# Patient Record
Sex: FEMALE | Race: WHITE
[De-identification: names, ages, dates, MRNs, and addresses within clinical notes are randomized per-mention and may not be internally consistent; named-entity substitution may affect disease eponyms.]

---

## 2017-10-16 ENCOUNTER — HOSPITAL ENCOUNTER (OUTPATIENT)
Dept: HOSPITAL 22 - RAD | Age: 77
End: 2017-10-16
Attending: INTERNAL MEDICINE
Payer: MEDICARE

## 2017-10-16 DIAGNOSIS — R47.01: ICD-10-CM

## 2017-10-16 DIAGNOSIS — R51: Primary | ICD-10-CM

## 2017-10-17 NOTE — RADIOLOGY REPORT PS360
MRI-BRAIN W/O
 
HISTORY: Severe headache with achalasia
APHASIA, ACUTE NONINTRACTABLE HEADACHE
ORDERING PHYSICIAN: Otis Caal MD
PATIENT AGE:  77 years
 
 
COMPARISON: CT scan of 2/17/2010
 
TECHNIQUE: Standard multiplanar multiecho sequences are performed without
contrast.
 
FINDINGS: No midline shift, mass effect, intracranial hemorrhage, hydrocephalus,
or acute infarction is evident. The cerebellopontine angles, cerebellum, and
brainstem are unremarkable. Minimal nonspecific periventricular T2 white matter
hyperintensity noted likely related to minimal ischemic gliotic microvascular
changes. The pituitary and optic chiasm are unremarkable. The capital gyri are
unremarkable in the temporal horns are symmetric No mastoid effusion or sinus
air-fluid level.
 
 
IMPRESSION: 
 
No acute finding. Essentially negative MRI of the brain without contrast.

## 2018-01-03 ENCOUNTER — HOSPITAL ENCOUNTER (OUTPATIENT)
Age: 78
End: 2018-01-03
Payer: MEDICARE

## 2018-01-03 DIAGNOSIS — R19.07: ICD-10-CM

## 2018-01-03 DIAGNOSIS — S20.212A: Primary | ICD-10-CM

## 2018-01-03 LAB
BUN SERPL-MCNC: 17 MG/DL (ref 7–18)
CREAT BLD-SCNC: 1.03 MG/DL (ref 0.55–1.02)
ESTIMATED GLOMERULAR FILT RATE: 52 ML/MIN (ref 60–?)
GFR (AFRICAN AMERICAN): > 60 ML/MIN (ref 60–?)

## 2018-01-03 PROCEDURE — 74177 CT ABD & PELVIS W/CONTRAST: CPT

## 2018-01-03 PROCEDURE — 84520 ASSAY OF UREA NITROGEN: CPT

## 2018-01-03 PROCEDURE — 71260 CT THORAX DX C+: CPT

## 2018-01-03 PROCEDURE — 82565 ASSAY OF CREATININE: CPT

## 2018-01-03 PROCEDURE — 36415 COLL VENOUS BLD VENIPUNCTURE: CPT

## 2018-04-27 ENCOUNTER — HOSPITAL ENCOUNTER (OUTPATIENT)
Age: 78
End: 2018-04-27
Payer: MEDICARE

## 2018-04-27 DIAGNOSIS — Z78.0: Primary | ICD-10-CM

## 2018-04-27 DIAGNOSIS — Z13.820: ICD-10-CM

## 2018-04-27 PROCEDURE — 77080 DXA BONE DENSITY AXIAL: CPT

## 2018-05-14 ENCOUNTER — HOSPITAL ENCOUNTER (OUTPATIENT)
Age: 78
End: 2018-05-14
Payer: MEDICARE
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
## 2018-05-14 DIAGNOSIS — M25.531: Primary | ICD-10-CM

## 2018-05-14 DIAGNOSIS — R47.89: ICD-10-CM

## 2018-05-14 DIAGNOSIS — R27.0: ICD-10-CM

## 2018-05-14 LAB
ALBUMIN LEVEL: 3.8 GM/DL (ref 3.4–5)
ALBUMIN/GLOB SERPL: 1.2 {RATIO} (ref 1.1–1.8)
ALP ISO SERPL-ACNC: 113 U/L (ref 46–116)
ALT SERPLBLD-CCNC: 25 U/L (ref 12–78)
ANION GAP SERPL CALC-SCNC: 14.8 MEQ/L (ref 5–15)
AST SERPL QL: 16 U/L (ref 15–37)
BILIRUBIN,TOTAL: 0.6 MG/DL (ref 0.2–1)
BUN SERPL-MCNC: 20 MG/DL (ref 7–18)
CALCIUM SPEC-MCNC: 9.4 MG/DL (ref 8.5–10.1)
CHLORIDE SPEC-SCNC: 104 MMOL/L (ref 98–107)
CO2 SERPL-SCNC: 26 MMOL/L (ref 21–32)
CREAT BLD-SCNC: 0.92 MG/DL (ref 0.55–1.02)
ESTIMATED GLOMERULAR FILT RATE: 59 ML/MIN (ref 60–?)
FT4I SERPL CALC-MCNC: 2.7 UG/DL (ref 5.93–13.13)
GFR (AFRICAN AMERICAN): 72 ML/MIN (ref 60–?)
GLOBULIN SER CALC-MCNC: 3.3 GM/DL (ref 1.3–3.2)
GLUCOSE: 87 MG/DL (ref 74–106)
HCT VFR BLD CALC: 42.4 % (ref 37–47)
HGB BLD-MCNC: 13.9 G/DL (ref 12.2–16.2)
MCHC RBC-ENTMCNC: 32.8 G/DL (ref 31.8–35.4)
MCV RBC: 86.7 FL (ref 81–99)
MEAN CORPUSCULAR HEMOGLOBIN: 28.5 PG (ref 27–31.2)
PLATELET # BLD: 252 K/MM3 (ref 142–424)
POTASSIUM: 4.8 MMOL/L (ref 3.5–5.1)
PROT SERPL-MCNC: 7.1 GM/DL (ref 6.4–8.2)
RBC # BLD AUTO: 4.89 M/MM3 (ref 4.2–5.4)
SODIUM SPEC-SCNC: 140 MMOL/L (ref 136–145)
T3RU NFR SERPL: 36 % (ref 31–39)
T4 (THYROXINE): 7.5 UG/DL (ref 4.7–13.3)
TSH SERPL-ACNC: 1.48 UIU/ML (ref 0.36–3.74)
WBC # BLD AUTO: 5.8 K/MM3 (ref 4.8–10.8)

## 2018-05-14 PROCEDURE — 82607 VITAMIN B-12: CPT

## 2018-05-14 PROCEDURE — 84436 ASSAY OF TOTAL THYROXINE: CPT

## 2018-05-14 PROCEDURE — 85025 COMPLETE CBC W/AUTO DIFF WBC: CPT

## 2018-05-14 PROCEDURE — 36415 COLL VENOUS BLD VENIPUNCTURE: CPT

## 2018-05-14 PROCEDURE — 84443 ASSAY THYROID STIM HORMONE: CPT

## 2018-05-14 PROCEDURE — 73110 X-RAY EXAM OF WRIST: CPT

## 2018-05-14 PROCEDURE — 84479 ASSAY OF THYROID (T3 OR T4): CPT

## 2018-05-14 PROCEDURE — 80053 COMPREHEN METABOLIC PANEL: CPT

## 2018-05-14 PROCEDURE — 82131 AMINO ACIDS SINGLE QUANT: CPT

## 2018-05-15 LAB — VITAMIN B12: 450 PG/ML (ref 232–1245)

## 2018-05-18 ENCOUNTER — HOSPITAL ENCOUNTER (OUTPATIENT)
Age: 78
End: 2018-05-18
Payer: MEDICARE

## 2018-05-18 DIAGNOSIS — R47.89: ICD-10-CM

## 2018-05-18 DIAGNOSIS — R27.0: Primary | ICD-10-CM

## 2018-05-18 PROCEDURE — 70551 MRI BRAIN STEM W/O DYE: CPT

## 2018-08-15 ENCOUNTER — HOSPITAL ENCOUNTER (OUTPATIENT)
Dept: HOSPITAL 22 - ST | Age: 78
Discharge: HOME | End: 2018-08-15
Payer: MEDICARE

## 2018-08-15 DIAGNOSIS — R47.01: Primary | ICD-10-CM

## 2018-08-15 DIAGNOSIS — R47.89: ICD-10-CM

## 2018-08-15 PROCEDURE — 97532: CPT

## 2018-08-15 PROCEDURE — 92523 SPEECH SOUND LANG COMPREHEN: CPT

## 2018-08-15 PROCEDURE — 92507 TX SP LANG VOICE COMM INDIV: CPT

## 2018-09-26 ENCOUNTER — HOSPITAL ENCOUNTER (OUTPATIENT)
Age: 78
End: 2018-09-26
Payer: MEDICARE

## 2018-09-26 DIAGNOSIS — R05: ICD-10-CM

## 2018-09-26 DIAGNOSIS — R53.83: ICD-10-CM

## 2018-09-26 DIAGNOSIS — Z85.841: ICD-10-CM

## 2018-09-26 DIAGNOSIS — E78.2: ICD-10-CM

## 2018-09-26 DIAGNOSIS — R47.81: Primary | ICD-10-CM

## 2018-09-26 LAB
ALBUMIN LEVEL: 3.4 GM/DL (ref 3.4–5)
ALBUMIN/GLOB SERPL: 1.1 {RATIO} (ref 1.1–1.8)
ALP ISO SERPL-ACNC: 116 U/L (ref 46–116)
ALT SERPLBLD-CCNC: 21 U/L (ref 12–78)
ANION GAP SERPL CALC-SCNC: 11.8 MEQ/L (ref 5–15)
AST SERPL QL: 14 U/L (ref 15–37)
BILIRUBIN,TOTAL: 0.5 MG/DL (ref 0.2–1)
BUN SERPL-MCNC: 19 MG/DL (ref 7–18)
CALCIUM SPEC-MCNC: 8.9 MG/DL (ref 8.5–10.1)
CHLORIDE SPEC-SCNC: 104 MMOL/L (ref 98–107)
CHOLEST SPEC-SCNC: 191 MG/DL (ref 140–200)
CO2 SERPL-SCNC: 29 MMOL/L (ref 21–32)
CREAT BLD-SCNC: 0.93 MG/DL (ref 0.55–1.02)
ESTIMATED GLOMERULAR FILT RATE: 58 ML/MIN (ref 60–?)
GFR (AFRICAN AMERICAN): 71 ML/MIN (ref 60–?)
GLOBULIN SER CALC-MCNC: 3.1 GM/DL (ref 1.3–3.2)
GLUCOSE: 82 MG/DL (ref 74–106)
HCT VFR BLD CALC: 41.9 % (ref 37–47)
HDLC SERPL-MCNC: 64 MG/DL (ref 29–89)
HGB BLD-MCNC: 13.5 G/DL (ref 12.2–16.2)
MCHC RBC-ENTMCNC: 32.2 G/DL (ref 31.8–35.4)
MCV RBC: 87.8 FL (ref 81–99)
MEAN CORPUSCULAR HEMOGLOBIN: 28.3 PG (ref 27–31.2)
PLATELET # BLD: 232 K/MM3 (ref 142–424)
POTASSIUM: 4.8 MMOL/L (ref 3.5–5.1)
PROT SERPL-MCNC: 6.5 GM/DL (ref 6.4–8.2)
RBC # BLD AUTO: 4.77 M/MM3 (ref 4.2–5.4)
SODIUM SPEC-SCNC: 140 MMOL/L (ref 136–145)
T4 FREE SERPL-MCNC: 0.97 NG/DL (ref 0.76–1.46)
TRIGLYCERIDES: 103 MG/DL (ref 30–200)
TSH SERPL-ACNC: 2.47 UIU/ML (ref 0.36–3.74)
WBC # BLD AUTO: 5 K/MM3 (ref 4.8–10.8)

## 2018-09-26 PROCEDURE — 84439 ASSAY OF FREE THYROXINE: CPT

## 2018-09-26 PROCEDURE — 85025 COMPLETE CBC W/AUTO DIFF WBC: CPT

## 2018-09-26 PROCEDURE — 82652 VIT D 1 25-DIHYDROXY: CPT

## 2018-09-26 PROCEDURE — 80053 COMPREHEN METABOLIC PANEL: CPT

## 2018-09-26 PROCEDURE — 80061 LIPID PANEL: CPT

## 2018-09-26 PROCEDURE — 70551 MRI BRAIN STEM W/O DYE: CPT

## 2018-09-26 PROCEDURE — 82607 VITAMIN B-12: CPT

## 2018-09-26 PROCEDURE — 36415 COLL VENOUS BLD VENIPUNCTURE: CPT

## 2018-09-26 PROCEDURE — 84443 ASSAY THYROID STIM HORMONE: CPT

## 2018-09-27 LAB — VITAMIN B12: 441 PG/ML (ref 232–1245)

## 2019-01-28 ENCOUNTER — OFFICE VISIT (OUTPATIENT)
Dept: PULMONOLOGY | Facility: CLINIC | Age: 79
End: 2019-01-28

## 2019-01-28 VITALS
WEIGHT: 158.13 LBS | DIASTOLIC BLOOD PRESSURE: 70 MMHG | RESPIRATION RATE: 16 BRPM | HEIGHT: 65 IN | BODY MASS INDEX: 26.35 KG/M2 | SYSTOLIC BLOOD PRESSURE: 146 MMHG | HEART RATE: 61 BPM | OXYGEN SATURATION: 98 % | TEMPERATURE: 98.2 F

## 2019-01-28 DIAGNOSIS — K21.9 GASTROESOPHAGEAL REFLUX DISEASE, ESOPHAGITIS PRESENCE NOT SPECIFIED: ICD-10-CM

## 2019-01-28 DIAGNOSIS — J30.89 NON-SEASONAL ALLERGIC RHINITIS, UNSPECIFIED TRIGGER: ICD-10-CM

## 2019-01-28 DIAGNOSIS — R05.9 COUGH: Primary | ICD-10-CM

## 2019-01-28 DIAGNOSIS — R06.02 SOB (SHORTNESS OF BREATH): Primary | ICD-10-CM

## 2019-01-28 DIAGNOSIS — R05.3 CHRONIC COUGH: ICD-10-CM

## 2019-01-28 PROCEDURE — 94375 RESPIRATORY FLOW VOLUME LOOP: CPT | Performed by: INTERNAL MEDICINE

## 2019-01-28 PROCEDURE — 99204 OFFICE O/P NEW MOD 45 MIN: CPT | Performed by: INTERNAL MEDICINE

## 2019-01-28 PROCEDURE — 94729 DIFFUSING CAPACITY: CPT | Performed by: INTERNAL MEDICINE

## 2019-01-28 PROCEDURE — 94726 PLETHYSMOGRAPHY LUNG VOLUMES: CPT | Performed by: INTERNAL MEDICINE

## 2019-01-28 RX ORDER — CETIRIZINE HYDROCHLORIDE 10 MG/1
10 TABLET ORAL DAILY
COMMUNITY
End: 2019-01-28 | Stop reason: ALTCHOICE

## 2019-01-28 RX ORDER — OMEPRAZOLE 40 MG/1
40 CAPSULE, DELAYED RELEASE ORAL DAILY
Qty: 30 CAPSULE | Refills: 12 | Status: SHIPPED | OUTPATIENT
Start: 2019-01-28

## 2019-01-28 NOTE — PROGRESS NOTES
Viky Magana is a 78 y.o. female here for evaluation of   Chief Complaint   Patient presents with   • Cough   • Speech Problem       Problem list:  1. Chronic cough  2. Nonseasonal allergic rhinitis  3. GERD  4. Calcified granulomatous findings on chest x-ray  5.  2 para 2, no complications  6. No known drug allergies    History of Present Illness    78-year-old woman, lifetime nonsmoker, referred for evaluation of chronic cough. She reports a daily cough for the last 4 years. It is typically nonproductive. It can be triggered by talking. She has noted a change in her voice. It waxes and wanes but is never gone. She will cough when she lays down at night but when she goes to sleep it does not awaken her. She did take doxycycline in mid December for a productive cough. It caused some diarrhea and abdominal cramping. She has a significant history of GERD with reflux and burning. She was placed on Protonix and her son tells me that she reported improvement. However she did not continue the medication. She could not tell that it helped her cough. She has been seen by  for colonoscopy but has never had an upper endoscopy. She was referred to ear nose throat for her voice changes. She did undergo direct laryngoscopy. She was referred to speech therapy but only completed 2 sessions. Her sedimentation rate was 7, white count 5.8, hemoglobin 13.9, electrolytes normal. She had a CT scan of the chest in [place],  but I do not have those results. She denies exposure to noxious fumes. She has lived in the same house for 60 years. It has never flooded and she does not have visible mold. She cannot tell me anything that exacerbates the symptoms or improves the symptoms. Whether it be rather, scented candles, pollen she cannot tell. She does have a constant watery dripping from her nose. It does not change with seasons or locations. She does not travel much and cannot tell me if this if a couple of days away from  home makes her symptoms better. She worked at [employer] for 15 years before retiring. 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 She denies a history of asthma and denies wheezing.    Review of Systems   Constitutional: Negative for activity change, appetite change and fatigue.   HENT: Positive for rhinorrhea.    Eyes: Negative for visual disturbance.   Respiratory: Positive for cough.    Cardiovascular: Negative for chest pain, palpitations and leg swelling.   Gastrointestinal: Negative for abdominal distention, diarrhea and nausea.   Endocrine: Negative.    Genitourinary: Negative for dysuria and hematuria.   Musculoskeletal: Positive for arthralgias.   Skin: Negative for rash.   Allergic/Immunologic: Negative for environmental allergies.   Neurological: Negative for dizziness, syncope, light-headedness, numbness and headaches.   Hematological: Negative for adenopathy. Does not bruise/bleed easily.   Psychiatric/Behavioral: Negative.          Current Outpatient Medications:   •  Fluticasone Furoate-Vilanterol (BREO ELLIPTA) 100-25 MCG/INH inhaler, Inhale 1 puff Daily. 1 inhalation once a day, Disp: 1 each, Rfl: 5  •  omeprazole (priLOSEC) 40 MG capsule, Take 1 capsule by mouth Daily., Disp: 30 capsule, Rfl: 12    Past Medical History:   Diagnosis Date   • History of prior pregnancies      SVDx2     Past Surgical History:   Procedure Laterality Date   • COLONOSCOPY     • LARYNGOSCOPY  2018     Social History     Socioeconomic History   • Marital status:      Spouse name: Not on file   • Number of children: 2   • Years of education: Not on file   • Highest education level: Not on file   Occupational History   • Occupation: retired, [employer]   Tobacco Use   • Smoking status: Never Smoker   • Smokeless tobacco: Never Used   Substance and Sexual Activity   • Alcohol use: No     Frequency: Never   • Drug use: No   • Sexual activity: Defer     Family History   Problem Relation Age of  "Onset   • Cancer Mother    • Lymphoma Mother    • Stroke Brother    • Hypertension Brother    • Coronary artery disease Father    • Stomach cancer Brother    • No Known Problems Brother      Blood pressure 146/70, pulse 61, temperature 98.2 °F (36.8 °C), resp. rate 16, height 163.8 cm (64.5\"), weight 71.7 kg (158 lb 2 oz), SpO2 98 %.    Physical Exam   Constitutional: She is oriented to person, place, and time. She appears well-developed and well-nourished. No distress.   HENT:   Head: Normocephalic and atraumatic.   Nasal erythema, no ulcers, mild clear drainage, Nasal mucosa erythema, septum midline   Eyes: Pupils are equal, round, and reactive to light. No scleral icterus.   Neck: No JVD present. No tracheal deviation present. No thyromegaly present.   Cardiovascular: Normal rate, regular rhythm and normal heart sounds.   No murmur heard.  Pulmonary/Chest: Effort normal and breath sounds normal. No respiratory distress.   Mild end expiratory wheeze with forced expiration   Abdominal: Soft. Bowel sounds are normal. She exhibits no distension. There is no tenderness.   Musculoskeletal: She exhibits no edema.   Neurological: She is alert and oriented to person, place, and time.   Skin: Skin is warm and dry.   Psychiatric: She has a normal mood and affect.         PFTs:  FVC 1.87 L, 68%, FEV1 1.49 L, 72% ratio 79%, total lung capacity 3.76 L, 78%, diffusion capacity 13.2, 69%, possible restriction, mild diffusion impairment; although I suspect her vital capacity maneuver was not maximum in this resulted in a low FVC and diffusion capacity.    Radiology:  Chest x-ray report from [facility], October 30, 2018, no cardiomegaly, fine basilar scarring.    Chest x-ray today lungs are well-inflated, no cardiomegaly, mild calcified granulomatous changes in both tricia, no effusion or infiltrate    Lab:  November 2018, white count 5.8, hemoglobin 13.9, platelet count 272, sodium 142, potassium 4.4, chloride 102, bicarbonate " 24, BUN 20, creatinine 1, glucose 95, ESR 7    Viky was seen today for cough and speech problem.    Diagnoses and all orders for this visit:    Cough  -     XR Chest PA & Lateral    Chronic cough    Non-seasonal allergic rhinitis, unspecified trigger    Gastroesophageal reflux disease, esophagitis presence not specified    Other orders  -     omeprazole (priLOSEC) 40 MG capsule; Take 1 capsule by mouth Daily.  -     Fluticasone Furoate-Vilanterol (BREO ELLIPTA) 100-25 MCG/INH inhaler; Inhale 1 puff Daily. 1 inhalation once a day        Discussion:   Delightful 78-year-old woman here for evaluation of chronic cough. In general, a chronic cough in a nonsmoker with a clear chest x-ray is typically cough variant asthma, GERD, upper airway syndrome, industrial bronchitis. Rarely occult mitral valve disease, endobronchial lesions, medication, occult interstitial lung disease, post infectious cough can persist. Today on examination she does have very faint end expiratory wheezing. Her pulmonary function tests do not show significant obstruction but her vital capacity testing maneuver was suboptimal. She has daily reflux symptoms which can cause of adult onset asthma. In addition, she has significant clear sinus drainage which can also irritate the vocal cords and larynx and resultant in chronic cough. It does not appear to be seasonally driven. She cannot pin down any triggers for this cough or why she has some better days than others. I think she has cough so long that she does not even pay attention to it. Part of her lack of benefit from treatment is that she did not take an adequate trial as prescribed by her primary MD.    Obtained a CT scan of the chest, no contrast, high resolution to rule out bronchiectasis, interstitial lung disease    Obtain records from ear nose throat    Restart a proton pump inhibitor, omeprazole is covered by her insurance    Stopped Zyrtec, start Benadryl at bedtime    Start Breo 100-25mcg  1 puff daily, rinse mouth after use    Refer to gastroenterology for an EGD ( also time for colonoscopy- Anastasiya did her last one)    Follow-up in 4 weeks    Ree Ocampo MD

## 2019-01-29 DIAGNOSIS — R05.3 CHRONIC COUGH: Primary | ICD-10-CM

## 2019-01-29 DIAGNOSIS — K21.9 GASTROESOPHAGEAL REFLUX DISEASE, ESOPHAGITIS PRESENCE NOT SPECIFIED: ICD-10-CM

## 2019-02-04 ENCOUNTER — HOSPITAL ENCOUNTER (OUTPATIENT)
Dept: CT IMAGING | Facility: HOSPITAL | Age: 79
Discharge: HOME | End: 2019-02-04
Attending: INTERNAL MEDICINE | Admitting: INTERNAL MEDICINE

## 2019-02-04 DIAGNOSIS — R05.3 CHRONIC COUGH: ICD-10-CM

## 2019-02-04 PROCEDURE — 71250 CT THORAX DX C-: CPT

## 2019-02-13 ENCOUNTER — OFFICE VISIT (OUTPATIENT)
Dept: PULMONOLOGY | Facility: CLINIC | Age: 79
End: 2019-02-13

## 2019-02-13 VITALS
HEIGHT: 68 IN | OXYGEN SATURATION: 98 % | DIASTOLIC BLOOD PRESSURE: 82 MMHG | SYSTOLIC BLOOD PRESSURE: 130 MMHG | HEART RATE: 78 BPM | BODY MASS INDEX: 24.1 KG/M2 | TEMPERATURE: 98.6 F | WEIGHT: 159 LBS

## 2019-02-13 DIAGNOSIS — R47.1 DYSARTHRIA: ICD-10-CM

## 2019-02-13 DIAGNOSIS — J30.89 NON-SEASONAL ALLERGIC RHINITIS, UNSPECIFIED TRIGGER: ICD-10-CM

## 2019-02-13 DIAGNOSIS — K21.9 GASTROESOPHAGEAL REFLUX DISEASE, ESOPHAGITIS PRESENCE NOT SPECIFIED: ICD-10-CM

## 2019-02-13 DIAGNOSIS — R05.3 CHRONIC COUGH: Primary | ICD-10-CM

## 2019-02-13 PROCEDURE — 99214 OFFICE O/P EST MOD 30 MIN: CPT | Performed by: NURSE PRACTITIONER

## 2019-02-13 PROCEDURE — 94375 RESPIRATORY FLOW VOLUME LOOP: CPT | Performed by: NURSE PRACTITIONER

## 2019-02-13 RX ORDER — MONTELUKAST SODIUM 10 MG/1
10 TABLET ORAL NIGHTLY
Qty: 30 TABLET | Refills: 5 | Status: SHIPPED | OUTPATIENT
Start: 2019-02-13

## 2019-02-13 NOTE — PROGRESS NOTES
[facility] ([facility]    CHIEF COMPLAINT    Chronic cough    HISTORY OF PRESENT ILLNESS    Viky Magana is a 78 y.o.female here today for follow-up of her chronic cough.  She was last seen one month ago by .  At that time she was started on omeprazole daily for GERD.  She was also started on Breo 100 daily.  She states that her cough has improved since starting these medications.  She still has a dry cough on occasion.  Since her last visit she has also had an EGD and colonoscopy performed.  She has not had the results of these, she was told that her colonoscopy was normal and that she did not have to have a repeat colonoscopy.       she denies fever, chills, sputum production, hemoptysis, night sweats, weight loss, chest pain or palpitations.  She denies lower extremity edema.  Since starting the omeprazole she has not had any GERD symptoms.  She continues to have constant rhinorrhea, postnasal drip and nasal congestion.  She has been taking Zyrtec daily for her congestion.  She does not notice any change in her symptoms.      She has also having difficulty with her speech and has been having difficulty with this for some time.  She notices that she has difficulty getting the words out but she wants to say.  She will sometimes have to slow down and think about what she is going to say before comes out.  She was referred to a speech therapist but only completed 2 sessions and didn't like it was helping so she discontinued the treatment.  She's been evaluated by Dr. Akbar Guadarrama and states she had CT scan and MRI of her head.  The family states that they were not impressed with his care.    Patient Active Problem List   Diagnosis   • Chronic cough   • Non-seasonal allergic rhinitis   • GERD (gastroesophageal reflux disease)   • Dysarthria       No Known Allergies    Current Outpatient Medications:   •  Fluticasone Furoate-Vilanterol (BREO ELLIPTA) 100-25 MCG/INH inhaler, Inhale 1 puff Daily. 1  "inhalation once a day, Disp: 1 each, Rfl: 5  •  omeprazole (priLOSEC) 40 MG capsule, Take 1 capsule by mouth Daily., Disp: 30 capsule, Rfl: 12  •  montelukast (SINGULAIR) 10 MG tablet, Take 1 tablet by mouth Every Night., Disp: 30 tablet, Rfl: 5  MEDICATION LIST AND ALLERGIES REVIEWED.    Social History     Tobacco Use   • Smoking status: Never Smoker   • Smokeless tobacco: Never Used   Substance Use Topics   • Alcohol use: No     Frequency: Never   • Drug use: No       FAMILY AND SOCIAL HISTORY REVIEWED.    Review of Systems   Constitutional: Negative for activity change, appetite change, fatigue, fever and unexpected weight change.   HENT: Positive for congestion, postnasal drip and rhinorrhea. Negative for sinus pressure, sore throat and voice change.    Eyes: Negative for visual disturbance.   Respiratory: Negative for cough, chest tightness, shortness of breath and wheezing.    Cardiovascular: Negative for chest pain, palpitations and leg swelling.   Gastrointestinal: Negative for abdominal distention, abdominal pain, nausea and vomiting.   Endocrine: Negative for cold intolerance and heat intolerance.   Genitourinary: Negative for difficulty urinating and urgency.   Musculoskeletal: Negative for arthralgias, back pain and neck pain.   Skin: Negative for color change and pallor.   Allergic/Immunologic: Negative for environmental allergies and food allergies.   Neurological: Positive for speech difficulty. Negative for dizziness, syncope, weakness and light-headedness.   Hematological: Negative for adenopathy. Does not bruise/bleed easily.   Psychiatric/Behavioral: Negative for agitation and behavioral problems.   .    /82   Pulse 78   Temp 98.6 °F (37 °C)   Ht 172.7 cm (68\")   Wt 72.1 kg (159 lb)   SpO2 98% Comment: room air at rest  BMI 24.18 kg/m²     Immunization History   Administered Date(s) Administered   • Influenza, Unspecified 11/14/2018       Physical Exam   Constitutional: She is oriented " to person, place, and time. She appears well-developed and well-nourished.   HENT:   Head: Normocephalic and atraumatic.   Eyes: Pupils are equal, round, and reactive to light.   Neck: Normal range of motion. Neck supple. No thyromegaly present.   Cardiovascular: Normal rate, regular rhythm, normal heart sounds and intact distal pulses. Exam reveals no gallop and no friction rub.   No murmur heard.  Pulmonary/Chest: Effort normal and breath sounds normal. No respiratory distress. She has no wheezes. She has no rales. She exhibits no tenderness.   Abdominal: Soft. Bowel sounds are normal. There is no tenderness.   Musculoskeletal: Normal range of motion.   Lymphadenopathy:     She has no cervical adenopathy.   Neurological: She is alert and oriented to person, place, and time.   Skin: Skin is warm and dry. Capillary refill takes less than 2 seconds. She is not diaphoretic.   Psychiatric: She has a normal mood and affect. Her behavior is normal.   Nursing note and vitals reviewed.        RESULTS    PFTS in the office today, read by me.    Spirometry Interpretation 02/13/19:    1. No airway obstruction as the FEV1 ratio >= 0.7.  2. The maximum voluntary ventilation (MVV) is normal.    Ct Chest Hi Resolution    Result Date: 2/5/2019  Mild chronic interstitial changes seen predominantly at the lung bases with bronchiectatic changes centrally. There is no acute intrathoracic abnormality or parenchymal consolidation.  D:  02/05/2019 E:  02/05/2019  This report was finalized on 2/5/2019 3:13 PM by Dr. Jeannette Chapman MD.      PROBLEM LIST    Problem List Items Addressed This Visit        Respiratory    Chronic cough - Primary    Relevant Orders    Spirometry Without Bronchodilator (Completed)    Non-seasonal allergic rhinitis    Relevant Medications    montelukast (SINGULAIR) 10 MG tablet       Digestive    GERD (gastroesophageal reflux disease)       Nervous and Auditory    Dysarthria            DISCUSSION      Izzy was here for follow-up of her cough.  We reviewed her CT scan in detail today.  She she has bronchiectasis and mild chronic interstitial changes per CT scan.  We discussed bronchiectasis in detail today and the patient is understanding of this diagnosis.  She will continue to take Breo daily for her cough.  I'm going to add Singulair to her regimen for her chronic sinus drainage.  I also advised her that she could try a nasal spray to help dry up her sinuses.  She continues to have persistent nasal symptoms.  She states that her cough has improved since her last visit and she will need to continue this regimen from here on out.  I also advised her that she could  Mucinex and take 600 mg twice a day to help with secretion clearance.    She will continue omeprazole for her GERD.  She probably had some uncontrolled reflux as well that was contributing to her worsening cough.    I will obtain records from Dr. Akbar Guadarrama's office.  She has a history of dementia and her family and I'm concerned about her speech.  She states that she has had a CT scan of her head done in the past and an MRI.  She tells me that these were done at [facility].  I will obtain the reports from these records as well.  I did offer to refer her to start outpatient speech therapy and told her that she needed to try to complete the therapy if she wanted her speech to improve.  She is going to call me if she wants to restart this therapy.    She will follow-up in 3 months or sooner if her symptoms worsen.  I spent 25 minutes with the patient and family. I spent > 50% percent of this time counseling and discussing diagnosis, prognosis, diagnostic testing, evaluation, current status, treatment options and management.    TWILA Blanca  02/13/20192:54 PM  Electronically signed     Please note that portions of this note were completed with a voice recognition program. Efforts were made to edit the dictations, but occasionally  words are mistranscribed.      CC: Eamon Gann MD

## 2019-05-21 ENCOUNTER — TELEPHONE (OUTPATIENT)
Dept: PULMONOLOGY | Facility: CLINIC | Age: 79
End: 2019-05-21

## 2019-05-21 NOTE — TELEPHONE ENCOUNTER
ATTEMPTED CALL TO RESCHEDULE APPT THAT PT MISSED TODAY WITH VERNELL GALARZA. NO ANSWER AND NO VOICEMAIL PHONE JUST RINGS.

## 2019-06-19 ENCOUNTER — HOSPITAL ENCOUNTER (OUTPATIENT)
Age: 79
End: 2019-06-19
Payer: MEDICARE

## 2019-06-19 DIAGNOSIS — R41.89: Primary | ICD-10-CM

## 2019-06-19 LAB
ALBUMIN LEVEL: 3.4 GM/DL (ref 3.4–5)
ALBUMIN/GLOB SERPL: 1 {RATIO} (ref 1.1–1.8)
ALP ISO SERPL-ACNC: 102 U/L (ref 46–116)
ALT SERPLBLD-CCNC: 38 U/L (ref 12–78)
ANION GAP SERPL CALC-SCNC: 11.6 MEQ/L (ref 5–15)
AST SERPL QL: 27 U/L (ref 15–37)
BILIRUBIN,TOTAL: 0.4 MG/DL (ref 0.2–1)
BUN SERPL-MCNC: 16 MG/DL (ref 7–18)
CALCIUM SPEC-MCNC: 8.3 MG/DL (ref 8.5–10.1)
CHLORIDE SPEC-SCNC: 106 MMOL/L (ref 98–107)
CO2 SERPL-SCNC: 27 MMOL/L (ref 21–32)
CREAT BLD-SCNC: 1.05 MG/DL (ref 0.55–1.02)
ESTIMATED GLOMERULAR FILT RATE: 51 ML/MIN (ref 60–?)
GFR (AFRICAN AMERICAN): 61 ML/MIN (ref 60–?)
GLOBULIN SER CALC-MCNC: 3.4 GM/DL (ref 1.3–3.2)
GLUCOSE: 93 MG/DL (ref 74–106)
HCT VFR BLD CALC: 41.1 % (ref 37–47)
HGB BLD-MCNC: 13 G/DL (ref 12.2–16.2)
MCHC RBC-ENTMCNC: 31.7 G/DL (ref 31.8–35.4)
MCV RBC: 84.3 FL (ref 81–99)
MEAN CORPUSCULAR HEMOGLOBIN: 26.7 PG (ref 27–31.2)
PLATELET # BLD: 254 K/MM3 (ref 142–424)
POTASSIUM: 4.6 MMOL/L (ref 3.5–5.1)
PROT SERPL-MCNC: 6.8 GM/DL (ref 6.4–8.2)
RBC # BLD AUTO: 4.87 M/MM3 (ref 4.2–5.4)
SODIUM SPEC-SCNC: 140 MMOL/L (ref 136–145)
WBC # BLD AUTO: 5.2 K/MM3 (ref 4.8–10.8)

## 2019-06-19 PROCEDURE — 85651 RBC SED RATE NONAUTOMATED: CPT

## 2019-06-19 PROCEDURE — 36415 COLL VENOUS BLD VENIPUNCTURE: CPT

## 2019-06-19 PROCEDURE — 85025 COMPLETE CBC W/AUTO DIFF WBC: CPT

## 2019-06-19 PROCEDURE — 82607 VITAMIN B-12: CPT

## 2019-06-19 PROCEDURE — 70552 MRI BRAIN STEM W/DYE: CPT

## 2019-06-19 PROCEDURE — 82746 ASSAY OF FOLIC ACID SERUM: CPT

## 2019-06-19 PROCEDURE — A9576 INJ PROHANCE MULTIPACK: HCPCS

## 2019-06-19 PROCEDURE — 80053 COMPREHEN METABOLIC PANEL: CPT

## 2019-06-20 LAB
FOLATE: 12.7 NG/ML (ref 3–?)
VITAMIN B12: 441 PG/ML (ref 232–1245)

## 2019-09-03 ENCOUNTER — HOSPITAL ENCOUNTER (OUTPATIENT)
Age: 79
End: 2019-09-03
Payer: MEDICARE

## 2019-09-03 DIAGNOSIS — G31.01: Primary | ICD-10-CM

## 2019-09-03 PROCEDURE — 95819 EEG AWAKE AND ASLEEP: CPT

## 2020-01-14 ENCOUNTER — HOSPITAL ENCOUNTER (OUTPATIENT)
Age: 80
End: 2020-01-14
Payer: MEDICARE

## 2020-01-14 DIAGNOSIS — S52.612A: ICD-10-CM

## 2020-01-14 DIAGNOSIS — Z01.818: Primary | ICD-10-CM

## 2020-01-14 DIAGNOSIS — S52.532A: ICD-10-CM

## 2020-01-14 LAB
ALBUMIN LEVEL: 3.7 GM/DL (ref 3.4–5)
ALBUMIN/GLOB SERPL: 1.2 {RATIO} (ref 1.1–1.8)
ALP ISO SERPL-ACNC: 115 U/L (ref 46–116)
ALT SERPLBLD-CCNC: 24 U/L (ref 12–78)
ANION GAP SERPL CALC-SCNC: 13.6 MEQ/L (ref 5–15)
AST SERPL QL: 15 U/L (ref 15–37)
BILIRUBIN,TOTAL: 0.6 MG/DL (ref 0.2–1)
BUN SERPL-MCNC: 18 MG/DL (ref 7–18)
CALCIUM SPEC-MCNC: 9.2 MG/DL (ref 8.5–10.1)
CHLORIDE SPEC-SCNC: 101 MMOL/L (ref 98–107)
CO2 SERPL-SCNC: 27 MMOL/L (ref 21–32)
CREAT BLD-SCNC: 1.02 MG/DL (ref 0.55–1.02)
ESTIMATED GLOMERULAR FILT RATE: 52 ML/MIN (ref 60–?)
GFR (AFRICAN AMERICAN): 63 ML/MIN (ref 60–?)
GLOBULIN SER CALC-MCNC: 3.1 GM/DL (ref 1.3–3.2)
GLUCOSE: 91 MG/DL (ref 74–106)
HCT VFR BLD CALC: 43.3 % (ref 37–47)
HGB BLD-MCNC: 14.1 G/DL (ref 12.2–16.2)
MCHC RBC-ENTMCNC: 32.5 G/DL (ref 31.8–35.4)
MCV RBC: 88.5 FL (ref 81–99)
MEAN CORPUSCULAR HEMOGLOBIN: 28.8 PG (ref 27–31.2)
PLATELET # BLD: 326 K/MM3 (ref 142–424)
POTASSIUM: 4.6 MMOL/L (ref 3.5–5.1)
PROT SERPL-MCNC: 6.8 GM/DL (ref 6.4–8.2)
RBC # BLD AUTO: 4.9 M/MM3 (ref 4.2–5.4)
SODIUM SPEC-SCNC: 137 MMOL/L (ref 136–145)
WBC # BLD AUTO: 9.6 K/MM3 (ref 4.8–10.8)

## 2020-01-14 PROCEDURE — 36415 COLL VENOUS BLD VENIPUNCTURE: CPT

## 2020-01-14 PROCEDURE — 93005 ELECTROCARDIOGRAM TRACING: CPT

## 2020-01-14 PROCEDURE — 80053 COMPREHEN METABOLIC PANEL: CPT

## 2020-01-14 PROCEDURE — 85025 COMPLETE CBC W/AUTO DIFF WBC: CPT

## 2020-02-04 ENCOUNTER — HOSPITAL ENCOUNTER (OUTPATIENT)
Age: 80
End: 2020-02-04
Payer: MEDICARE

## 2020-02-04 DIAGNOSIS — Z48.89: Primary | ICD-10-CM

## 2020-02-04 DIAGNOSIS — S52.612A: ICD-10-CM

## 2020-02-04 PROCEDURE — 73110 X-RAY EXAM OF WRIST: CPT

## 2020-02-25 ENCOUNTER — HOSPITAL ENCOUNTER (OUTPATIENT)
Age: 80
End: 2020-02-25
Payer: MEDICARE

## 2020-02-25 ENCOUNTER — HOSPITAL ENCOUNTER (OUTPATIENT)
Dept: HOSPITAL 22 - OT | Age: 80
Discharge: HOME | End: 2020-02-25
Payer: MEDICARE

## 2020-02-25 DIAGNOSIS — Z48.89: Primary | ICD-10-CM

## 2020-02-25 DIAGNOSIS — S52.612A: ICD-10-CM

## 2020-02-25 DIAGNOSIS — S52.502D: ICD-10-CM

## 2020-02-25 DIAGNOSIS — S52.532A: Primary | ICD-10-CM

## 2020-02-25 PROCEDURE — 97763 ORTHC/PROSTC MGMT SBSQ ENC: CPT

## 2020-02-25 PROCEDURE — 73110 X-RAY EXAM OF WRIST: CPT

## 2020-03-13 ENCOUNTER — HOSPITAL ENCOUNTER (OUTPATIENT)
Dept: HOSPITAL 22 - PT.CARL | Age: 80
LOS: 19 days | Discharge: HOME | End: 2020-04-01
Payer: MEDICARE

## 2020-03-13 ENCOUNTER — HOSPITAL ENCOUNTER (OUTPATIENT)
Dept: HOSPITAL 22 - ST | Age: 80
Discharge: HOME | End: 2020-03-13
Payer: MEDICARE

## 2020-03-13 DIAGNOSIS — Z48.89: ICD-10-CM

## 2020-03-13 DIAGNOSIS — S52.532D: Primary | ICD-10-CM

## 2020-03-13 DIAGNOSIS — R47.01: Primary | ICD-10-CM

## 2020-03-13 DIAGNOSIS — S52.612D: ICD-10-CM

## 2020-03-13 PROCEDURE — 97018 PARAFFIN BATH THERAPY: CPT

## 2020-03-13 PROCEDURE — 97163 PT EVAL HIGH COMPLEX 45 MIN: CPT

## 2020-03-13 PROCEDURE — 97110 THERAPEUTIC EXERCISES: CPT

## 2020-03-13 PROCEDURE — 97140 MANUAL THERAPY 1/> REGIONS: CPT

## 2020-03-13 PROCEDURE — 92523 SPEECH SOUND LANG COMPREHEN: CPT

## 2020-03-13 PROCEDURE — 92507 TX SP LANG VOICE COMM INDIV: CPT

## 2020-03-15 ENCOUNTER — HOSPITAL ENCOUNTER (INPATIENT)
Dept: HOSPITAL 22 - 2ND | Age: 80
LOS: 3 days | Discharge: HOME | DRG: 391 | End: 2020-03-18
Attending: INTERNAL MEDICINE | Admitting: INTERNAL MEDICINE

## 2020-03-15 LAB
ALBUMIN LEVEL: 4.4 G/DL (ref 3.5–5)
ALBUMIN/GLOB SERPL: 1.4 {RATIO} (ref 1.1–1.8)
ALP ISO SERPL-ACNC: 100 U/L (ref 38–126)
ALT SERPLBLD-CCNC: 41 U/L (ref 12–78)
AMORPH SED URNS QL MICRO: (no result) /LPF
AMYLASE SERPL-CCNC: 110 U/L (ref 30–110)
ANION GAP SERPL CALC-SCNC: 16.1 MEQ/L (ref 5–15)
AST SERPL QL: 34 U/L (ref 14–36)
BILIRUBIN,TOTAL: 0.6 MG/DL (ref 0.2–1.3)
BUN SERPL-MCNC: 17 MG/DL (ref 7–17)
CALCIUM SPEC-MCNC: 10 MG/DL (ref 8.4–10.2)
CELLS COUNTED: 100
CHLORIDE SPEC-SCNC: 97 MMOL/L (ref 98–107)
CO2 SERPL-SCNC: 26 MMOL/L (ref 22–30)
COLOR UR: YELLOW
GLOBULIN SER CALC-MCNC: 3.1 G/DL (ref 1.3–3.2)
GLUCOSE: 173 MG/DL (ref 74–100)
HCT VFR BLD CALC: 46 % (ref 37–47)
HGB BLD-MCNC: 15.4 G/DL (ref 12.2–16.2)
LEUKOCYTE ESTERASE UR QL STRIP: (no result)
MCHC RBC-ENTMCNC: 33.5 G/DL (ref 31.8–35.4)
MCV RBC: 88.7 FL (ref 81–99)
MICRO URNS: (no result)
PH UR: 5.5 [PH] (ref 5–8.5)
PLATELET # BLD: 257 K/MM3 (ref 142–424)
PROT SERPL-MCNC: 7.5 G/DL (ref 6.3–8.2)
PROT UR STRIP-MCNC: (no result) MG/DL
RBC # BLD AUTO: 5.18 M/MM3 (ref 4.2–5.4)
RBC #/AREA URNS HPF: (no result) #/HPF (ref 0–3)
SODIUM SPEC-SCNC: 135 MMOL/L (ref 136–145)
SP GR UR: >= 1.03 (ref 1–1.03)
TRANS CELLS URNS QL MICRO: (no result) #/LPF (ref 0–3)
UROBILINOGEN UR QL: 0.2 EU/DL
WBC # BLD AUTO: 24 K/MM3 (ref 4.8–10.8)

## 2020-03-15 PROCEDURE — 71046 X-RAY EXAM CHEST 2 VIEWS: CPT

## 2020-03-15 PROCEDURE — 36415 COLL VENOUS BLD VENIPUNCTURE: CPT

## 2020-03-15 PROCEDURE — 84484 ASSAY OF TROPONIN QUANT: CPT

## 2020-03-15 PROCEDURE — 87070 CULTURE OTHR SPECIMN AEROBIC: CPT

## 2020-03-15 PROCEDURE — 80048 BASIC METABOLIC PNL TOTAL CA: CPT

## 2020-03-15 PROCEDURE — 99285 EMERGENCY DEPT VISIT HI MDM: CPT

## 2020-03-15 PROCEDURE — 87040 BLOOD CULTURE FOR BACTERIA: CPT

## 2020-03-15 PROCEDURE — 93005 ELECTROCARDIOGRAM TRACING: CPT

## 2020-03-15 PROCEDURE — 71045 X-RAY EXAM CHEST 1 VIEW: CPT

## 2020-03-15 PROCEDURE — 74177 CT ABD & PELVIS W/CONTRAST: CPT

## 2020-03-15 PROCEDURE — 82150 ASSAY OF AMYLASE: CPT

## 2020-03-15 PROCEDURE — 96375 TX/PRO/DX INJ NEW DRUG ADDON: CPT

## 2020-03-15 PROCEDURE — 85007 BL SMEAR W/DIFF WBC COUNT: CPT

## 2020-03-15 PROCEDURE — 81001 URINALYSIS AUTO W/SCOPE: CPT

## 2020-03-15 PROCEDURE — 71020: CPT

## 2020-03-15 PROCEDURE — 96365 THER/PROPH/DIAG IV INF INIT: CPT

## 2020-03-15 PROCEDURE — 85025 COMPLETE CBC W/AUTO DIFF WBC: CPT

## 2020-03-15 PROCEDURE — 87205 SMEAR GRAM STAIN: CPT

## 2020-03-15 PROCEDURE — 71010: CPT

## 2020-03-15 PROCEDURE — 83605 ASSAY OF LACTIC ACID: CPT

## 2020-03-15 PROCEDURE — 80053 COMPREHEN METABOLIC PANEL: CPT

## 2020-03-15 PROCEDURE — 96367 TX/PROPH/DG ADDL SEQ IV INF: CPT

## 2020-03-15 PROCEDURE — 87086 URINE CULTURE/COLONY COUNT: CPT

## 2020-03-15 PROCEDURE — 87506 IADNA-DNA/RNA PROBE TQ 6-11: CPT

## 2020-03-15 PROCEDURE — 83690 ASSAY OF LIPASE: CPT

## 2020-03-17 LAB
ALBUMIN LEVEL: 3 G/DL (ref 3.5–5)
ALBUMIN/GLOB SERPL: 1.1 {RATIO} (ref 1.1–1.8)
ALP ISO SERPL-ACNC: 80 U/L (ref 38–126)
ALT SERPLBLD-CCNC: 14 U/L (ref 12–78)
ANION GAP SERPL CALC-SCNC: 9.5 MEQ/L (ref 5–15)
AST SERPL QL: 28 U/L (ref 14–36)
BILIRUBIN,TOTAL: 0.6 MG/DL (ref 0.2–1.3)
BUN SERPL-MCNC: 8 MG/DL (ref 7–17)
CALCIUM SPEC-MCNC: 8.4 MG/DL (ref 8.4–10.2)
CELLS COUNTED: 100
CHLORIDE SPEC-SCNC: 101 MMOL/L (ref 98–107)
CO2 SERPL-SCNC: 27 MMOL/L (ref 22–30)
GLOBULIN SER CALC-MCNC: 2.8 G/DL (ref 1.3–3.2)
GLUCOSE: 92 MG/DL (ref 74–100)
HCT VFR BLD CALC: 37 % (ref 37–47)
HGB BLD-MCNC: 12.3 G/DL (ref 12.2–16.2)
MCHC RBC-ENTMCNC: 33.4 G/DL (ref 31.8–35.4)
MCV RBC: 89.1 FL (ref 81–99)
PLATELET # BLD: 213 K/MM3 (ref 142–424)
PROT SERPL-MCNC: 5.8 G/DL (ref 6.3–8.2)
RBC # BLD AUTO: 4.15 M/MM3 (ref 4.2–5.4)
SODIUM SPEC-SCNC: 134 MMOL/L (ref 136–145)
WBC # BLD AUTO: 14 K/MM3 (ref 4.8–10.8)

## 2020-03-18 LAB
ANION GAP SERPL CALC-SCNC: 10.5 MEQ/L (ref 5–15)
BUN SERPL-MCNC: 8 MG/DL (ref 7–17)
CALCIUM SPEC-MCNC: 8.3 MG/DL (ref 8.4–10.2)
CHLORIDE SPEC-SCNC: 102 MMOL/L (ref 98–107)
CO2 SERPL-SCNC: 25 MMOL/L (ref 22–30)
GLUCOSE: 94 MG/DL (ref 74–100)
HCT VFR BLD CALC: 36.8 % (ref 37–47)
HGB BLD-MCNC: 12.3 G/DL (ref 12.2–16.2)
MCHC RBC-ENTMCNC: 33.6 G/DL (ref 31.8–35.4)
MCV RBC: 87.6 FL (ref 81–99)
PLATELET # BLD: 204 K/MM3 (ref 142–424)
RBC # BLD AUTO: 4.19 M/MM3 (ref 4.2–5.4)
SODIUM SPEC-SCNC: 134 MMOL/L (ref 136–145)
WBC # BLD AUTO: 10.4 K/MM3 (ref 4.8–10.8)

## 2020-03-31 ENCOUNTER — HOSPITAL ENCOUNTER (OUTPATIENT)
Age: 80
End: 2020-03-31
Payer: MEDICARE

## 2020-03-31 DIAGNOSIS — S52.612D: ICD-10-CM

## 2020-03-31 DIAGNOSIS — Z09: Primary | ICD-10-CM

## 2020-03-31 DIAGNOSIS — S52.532D: ICD-10-CM

## 2020-03-31 PROCEDURE — 73110 X-RAY EXAM OF WRIST: CPT

## 2020-04-10 ENCOUNTER — HOSPITAL ENCOUNTER (EMERGENCY)
Age: 80
Discharge: ANOTHER HOSPITAL | End: 2020-04-10
Payer: MEDICARE

## 2020-04-10 VITALS
SYSTOLIC BLOOD PRESSURE: 159 MMHG | TEMPERATURE: 97.8 F | RESPIRATION RATE: 20 BRPM | HEART RATE: 103 BPM | BODY MASS INDEX: 24.3 KG/M2 | OXYGEN SATURATION: 91 % | DIASTOLIC BLOOD PRESSURE: 106 MMHG

## 2020-04-10 VITALS
DIASTOLIC BLOOD PRESSURE: 102 MMHG | OXYGEN SATURATION: 100 % | SYSTOLIC BLOOD PRESSURE: 152 MMHG | RESPIRATION RATE: 20 BRPM | HEART RATE: 86 BPM

## 2020-04-10 VITALS
RESPIRATION RATE: 28 BRPM | OXYGEN SATURATION: 97 % | SYSTOLIC BLOOD PRESSURE: 156 MMHG | HEART RATE: 72 BPM | DIASTOLIC BLOOD PRESSURE: 59 MMHG

## 2020-04-10 VITALS — OXYGEN SATURATION: 100 %

## 2020-04-10 VITALS
OXYGEN SATURATION: 97 % | SYSTOLIC BLOOD PRESSURE: 174 MMHG | RESPIRATION RATE: 24 BRPM | DIASTOLIC BLOOD PRESSURE: 89 MMHG | TEMPERATURE: 98.78 F | HEART RATE: 98 BPM

## 2020-04-10 DIAGNOSIS — G30.9: ICD-10-CM

## 2020-04-10 DIAGNOSIS — Z79.899: ICD-10-CM

## 2020-04-10 DIAGNOSIS — I26.09: Primary | ICD-10-CM

## 2020-04-10 LAB
ALBUMIN LEVEL: 3.9 G/DL (ref 3.5–5)
ALBUMIN/GLOB SERPL: 1.2 {RATIO} (ref 1.1–1.8)
ALP ISO SERPL-ACNC: 96 U/L (ref 38–126)
ALT SERPLBLD-CCNC: 30 U/L (ref 12–78)
ANION GAP SERPL CALC-SCNC: 11.2 MEQ/L (ref 5–15)
AST SERPL QL: 32 U/L (ref 14–36)
BILIRUBIN,TOTAL: 0.4 MG/DL (ref 0.2–1.3)
BUN SERPL-MCNC: 20 MG/DL (ref 7–17)
CALCIUM SPEC-MCNC: 9.3 MG/DL (ref 8.4–10.2)
CHLORIDE SPEC-SCNC: 100 MMOL/L (ref 98–107)
CO2 BLDCOA-SCNC: 27.4 MMHG (ref 23–27)
CO2 SERPL-SCNC: 27 MMOL/L (ref 22–30)
COLOR UR: YELLOW
CREAT BLD-SCNC: 1.1 MG/DL (ref 0.52–1.04)
CREATININE CLEARANCE ESTIMATED: 48 ML/MIN (ref 50–200)
ESTIMATED GLOMERULAR FILT RATE: 48 ML/MIN (ref 60–?)
GFR (AFRICAN AMERICAN): 58 ML/MIN (ref 60–?)
GLOBULIN SER CALC-MCNC: 3.2 G/DL (ref 1.3–3.2)
GLUCOSE: 108 MG/DL (ref 74–100)
HCO3 BLDA-SCNC: 26.4 MMHG (ref 22–26)
HCT VFR BLD CALC: 41.9 % (ref 37–47)
HGB BLD-MCNC: 14 G/DL (ref 12.2–16.2)
LACTATE SERPL-MCNC: 2.3 MMOL/L (ref 0.7–2.1)
MCHC RBC-ENTMCNC: 33.3 G/DL (ref 31.8–35.4)
MCV RBC: 88.7 FL (ref 81–99)
MEAN CORPUSCULAR HEMOGLOBIN: 29.6 PG (ref 27–31.2)
MICRO URNS: (no result)
PCO2 BLDA: 35.3 MMHG (ref 35–45)
PH BLDA: 7.49 MMOL/L (ref 7.35–7.45)
PH UR: 6 [PH] (ref 5–8.5)
PLATELET # BLD: 224 K/MM3 (ref 142–424)
PO2 BLDA: 98.1 MMHG (ref 80–100)
POTASSIUM: 4.2 MMOL/L (ref 3.5–5.1)
PROT SERPL-MCNC: 7.1 G/DL (ref 6.3–8.2)
RBC # BLD AUTO: 4.72 M/MM3 (ref 4.2–5.4)
RBC #/AREA URNS HPF: (no result) #/HPF (ref 0–3)
REFLEX LACTIC: (no result)
SODIUM SPEC-SCNC: 134 MMOL/L (ref 136–145)
SOURCE: (no result)
SP GR UR: 1.02 (ref 1–1.03)
TROPONIN I: 0.01 NG/ML (ref 0–0.03)
TROPONIN I: 0.01 NG/ML (ref 0–0.03)
UROBILINOGEN UR QL: 0.2 EU/DL
WBC # BLD AUTO: 8.5 K/MM3 (ref 4.8–10.8)
WBC # UR: (no result) #/HPF (ref 0–3)

## 2020-04-10 PROCEDURE — 87430 STREP A AG IA: CPT

## 2020-04-10 PROCEDURE — 93005 ELECTROCARDIOGRAM TRACING: CPT

## 2020-04-10 PROCEDURE — 82803 BLOOD GASES ANY COMBINATION: CPT

## 2020-04-10 PROCEDURE — 96375 TX/PRO/DX INJ NEW DRUG ADDON: CPT

## 2020-04-10 PROCEDURE — 99285 EMERGENCY DEPT VISIT HI MDM: CPT

## 2020-04-10 PROCEDURE — 84484 ASSAY OF TROPONIN QUANT: CPT

## 2020-04-10 PROCEDURE — 81001 URINALYSIS AUTO W/SCOPE: CPT

## 2020-04-10 PROCEDURE — 71275 CT ANGIOGRAPHY CHEST: CPT

## 2020-04-10 PROCEDURE — 36415 COLL VENOUS BLD VENIPUNCTURE: CPT

## 2020-04-10 PROCEDURE — 96367 TX/PROPH/DG ADDL SEQ IV INF: CPT

## 2020-04-10 PROCEDURE — 87276 INFLUENZA A AG IF: CPT

## 2020-04-10 PROCEDURE — 87275 INFLUENZA B AG IF: CPT

## 2020-04-10 PROCEDURE — 87040 BLOOD CULTURE FOR BACTERIA: CPT

## 2020-04-10 PROCEDURE — 96365 THER/PROPH/DIAG IV INF INIT: CPT

## 2020-04-10 PROCEDURE — 71045 X-RAY EXAM CHEST 1 VIEW: CPT

## 2020-04-10 PROCEDURE — 85025 COMPLETE CBC W/AUTO DIFF WBC: CPT

## 2020-04-10 PROCEDURE — 83605 ASSAY OF LACTIC ACID: CPT

## 2020-04-10 PROCEDURE — 80053 COMPREHEN METABOLIC PANEL: CPT

## 2020-06-30 ENCOUNTER — HOSPITAL ENCOUNTER (OUTPATIENT)
Age: 80
End: 2020-06-30
Payer: MEDICARE

## 2020-06-30 DIAGNOSIS — R05: ICD-10-CM

## 2020-06-30 DIAGNOSIS — R41.3: ICD-10-CM

## 2020-06-30 DIAGNOSIS — R47.1: ICD-10-CM

## 2020-06-30 DIAGNOSIS — R13.10: ICD-10-CM

## 2020-06-30 DIAGNOSIS — I63.9: Primary | ICD-10-CM

## 2020-06-30 DIAGNOSIS — R47.01: ICD-10-CM

## 2020-06-30 LAB
ALBUMIN LEVEL: 3.9 G/DL (ref 3.5–5)
ALBUMIN/GLOB SERPL: 1.3 {RATIO} (ref 1.1–1.8)
ALP ISO SERPL-ACNC: 99 U/L (ref 38–126)
ALT SERPLBLD-CCNC: 19 U/L (ref 12–78)
ANION GAP SERPL CALC-SCNC: 12.2 MEQ/L (ref 5–15)
AST SERPL QL: 25 U/L (ref 14–36)
BILIRUBIN,TOTAL: 0.5 MG/DL (ref 0.2–1.3)
BUN SERPL-MCNC: 23 MG/DL (ref 7–17)
CALCIUM SPEC-MCNC: 9.2 MG/DL (ref 8.4–10.2)
CHLORIDE SPEC-SCNC: 104 MMOL/L (ref 98–107)
CHOLEST SPEC-SCNC: 189 MG/DL (ref 140–200)
CO2 SERPL-SCNC: 28 MMOL/L (ref 22–30)
CREAT BLD-SCNC: 1.1 MG/DL (ref 0.52–1.04)
ESTIMATED GLOMERULAR FILT RATE: 48 ML/MIN (ref 60–?)
GFR (AFRICAN AMERICAN): 58 ML/MIN (ref 60–?)
GLOBULIN SER CALC-MCNC: 3 G/DL (ref 1.3–3.2)
GLUCOSE: 126 MG/DL (ref 74–100)
HCT VFR BLD CALC: 39.2 % (ref 37–47)
HDLC SERPL-MCNC: 72 MG/DL (ref 40–60)
HGB BLD-MCNC: 13.4 G/DL (ref 12.2–16.2)
MCHC RBC-ENTMCNC: 34.1 G/DL (ref 31.8–35.4)
MCV RBC: 89 FL (ref 81–99)
MEAN CORPUSCULAR HEMOGLOBIN: 30.4 PG (ref 27–31.2)
PLATELET # BLD: 271 K/MM3 (ref 142–424)
POTASSIUM: 4.2 MMOL/L (ref 3.5–5.1)
PROT SERPL-MCNC: 6.9 G/DL (ref 6.3–8.2)
RBC # BLD AUTO: 4.41 M/MM3 (ref 4.2–5.4)
SODIUM SPEC-SCNC: 140 MMOL/L (ref 136–145)
TRIGLYCERIDES: 214 MG/DL (ref 30–150)
TSH SERPL-ACNC: 1.78 UIU/ML (ref 0.47–4.68)
WBC # BLD AUTO: 7.5 K/MM3 (ref 4.8–10.8)

## 2020-06-30 PROCEDURE — 84443 ASSAY THYROID STIM HORMONE: CPT

## 2020-06-30 PROCEDURE — 70553 MRI BRAIN STEM W/O & W/DYE: CPT

## 2020-06-30 PROCEDURE — 36415 COLL VENOUS BLD VENIPUNCTURE: CPT

## 2020-06-30 PROCEDURE — 80061 LIPID PANEL: CPT

## 2020-06-30 PROCEDURE — 85025 COMPLETE CBC W/AUTO DIFF WBC: CPT

## 2020-06-30 PROCEDURE — A9576 INJ PROHANCE MULTIPACK: HCPCS

## 2020-06-30 PROCEDURE — 82607 VITAMIN B-12: CPT

## 2020-06-30 PROCEDURE — 80053 COMPREHEN METABOLIC PANEL: CPT

## 2020-07-02 ENCOUNTER — HOSPITAL ENCOUNTER (OUTPATIENT)
Age: 80
End: 2020-07-02
Payer: MEDICARE

## 2020-07-02 DIAGNOSIS — R13.10: ICD-10-CM

## 2020-07-02 DIAGNOSIS — R06.00: ICD-10-CM

## 2020-07-02 DIAGNOSIS — R41.3: ICD-10-CM

## 2020-07-02 DIAGNOSIS — R60.0: ICD-10-CM

## 2020-07-02 DIAGNOSIS — R47.01: ICD-10-CM

## 2020-07-02 DIAGNOSIS — I26.99: ICD-10-CM

## 2020-07-02 DIAGNOSIS — R47.1: ICD-10-CM

## 2020-07-02 DIAGNOSIS — R05: ICD-10-CM

## 2020-07-02 DIAGNOSIS — I10: Primary | ICD-10-CM

## 2020-07-02 LAB — VITAMIN B12: 374 PG/ML (ref 232–1245)

## 2020-07-02 PROCEDURE — 92611 MOTION FLUOROSCOPY/SWALLOW: CPT

## 2020-07-02 PROCEDURE — 93306 TTE W/DOPPLER COMPLETE: CPT

## 2020-07-02 PROCEDURE — 93970 EXTREMITY STUDY: CPT

## 2020-07-02 PROCEDURE — 70371 SPEECH EVALUATION COMPLEX: CPT

## 2020-12-17 ENCOUNTER — HOSPITAL ENCOUNTER (OUTPATIENT)
Age: 80
End: 2020-12-17
Payer: MEDICARE

## 2020-12-17 DIAGNOSIS — I10: ICD-10-CM

## 2020-12-17 DIAGNOSIS — R60.9: ICD-10-CM

## 2020-12-17 DIAGNOSIS — F02.80: Primary | ICD-10-CM

## 2020-12-17 DIAGNOSIS — G30.9: ICD-10-CM

## 2020-12-17 LAB
ANION GAP SERPL CALC-SCNC: 8.6 MEQ/L (ref 5–15)
BUN SERPL-MCNC: 21 MG/DL (ref 7–17)
CALCIUM SPEC-MCNC: 9.6 MG/DL (ref 8.4–10.2)
CHLORIDE SPEC-SCNC: 102 MMOL/L (ref 98–107)
CO2 SERPL-SCNC: 30 MMOL/L (ref 22–30)
CREAT BLD-SCNC: 1.3 MG/DL (ref 0.52–1.04)
ESTIMATED GLOMERULAR FILT RATE: 39 ML/MIN (ref 60–?)
GFR (AFRICAN AMERICAN): 48 ML/MIN (ref 60–?)
GLUCOSE: 103 MG/DL (ref 74–100)
POTASSIUM: 4.6 MMOL/L (ref 3.5–5.1)
SODIUM SPEC-SCNC: 136 MMOL/L (ref 136–145)

## 2020-12-17 PROCEDURE — 36415 COLL VENOUS BLD VENIPUNCTURE: CPT

## 2020-12-17 PROCEDURE — 80048 BASIC METABOLIC PNL TOTAL CA: CPT

## 2021-01-21 ENCOUNTER — HOSPITAL ENCOUNTER (OUTPATIENT)
Age: 81
End: 2021-01-21
Payer: MEDICARE

## 2021-01-21 DIAGNOSIS — R63.5: Primary | ICD-10-CM

## 2021-01-21 DIAGNOSIS — R41.89: ICD-10-CM

## 2021-01-21 LAB
ALBUMIN LEVEL: 4.7 G/DL (ref 3.5–5)
ALBUMIN/GLOB SERPL: 1.7 {RATIO} (ref 1.1–1.8)
ALP ISO SERPL-ACNC: 111 U/L (ref 38–126)
ALT SERPLBLD-CCNC: 22 U/L (ref 12–78)
ANION GAP SERPL CALC-SCNC: 16.1 MEQ/L (ref 5–15)
AST SERPL QL: 30 U/L (ref 14–36)
BILIRUBIN,TOTAL: 0.4 MG/DL (ref 0.2–1.3)
BUN SERPL-MCNC: 27 MG/DL (ref 7–17)
CALCIUM SPEC-MCNC: 10.1 MG/DL (ref 8.4–10.2)
CHLORIDE SPEC-SCNC: 100 MMOL/L (ref 98–107)
CO2 SERPL-SCNC: 28 MMOL/L (ref 22–30)
CREAT BLD-SCNC: 1.1 MG/DL (ref 0.52–1.04)
ESTIMATED GLOMERULAR FILT RATE: 48 ML/MIN (ref 60–?)
FT4I SERPL CALC-MCNC: 2.8 UG/DL (ref 5.93–13.13)
GFR (AFRICAN AMERICAN): 58 ML/MIN (ref 60–?)
GLOBULIN SER CALC-MCNC: 2.8 G/DL (ref 1.3–3.2)
GLUCOSE: 97 MG/DL (ref 74–100)
POTASSIUM: 5.1 MMOL/L (ref 3.5–5.1)
PROT SERPL-MCNC: 7.5 G/DL (ref 6.3–8.2)
SODIUM SPEC-SCNC: 139 MMOL/L (ref 136–145)
T3RU NFR SERPL: 33 % (ref 23.5–40.5)
T4 (THYROXINE): 8.4 UG/DL (ref 5.53–11)
TSH SERPL-ACNC: 2.07 UIU/ML (ref 0.47–4.68)

## 2021-01-21 PROCEDURE — 84479 ASSAY OF THYROID (T3 OR T4): CPT

## 2021-01-21 PROCEDURE — 36415 COLL VENOUS BLD VENIPUNCTURE: CPT

## 2021-01-21 PROCEDURE — 84443 ASSAY THYROID STIM HORMONE: CPT

## 2021-01-21 PROCEDURE — 80053 COMPREHEN METABOLIC PANEL: CPT

## 2021-01-21 PROCEDURE — 84436 ASSAY OF TOTAL THYROXINE: CPT

## 2021-04-26 ENCOUNTER — HOSPITAL ENCOUNTER (OUTPATIENT)
Age: 81
End: 2021-04-26
Payer: MEDICARE

## 2021-04-26 DIAGNOSIS — I10: Primary | ICD-10-CM

## 2021-04-26 LAB
ALBUMIN LEVEL: 4.3 G/DL (ref 3.5–5)
ALBUMIN/GLOB SERPL: 1.8 {RATIO} (ref 1.1–1.8)
ALP ISO SERPL-ACNC: 119 U/L (ref 38–126)
ALT SERPLBLD-CCNC: 16 U/L (ref 12–78)
ANION GAP SERPL CALC-SCNC: 11.5 MEQ/L (ref 5–15)
AST SERPL QL: 23 U/L (ref 14–36)
BILIRUBIN,TOTAL: 0.7 MG/DL (ref 0.2–1.3)
BUN SERPL-MCNC: 26 MG/DL (ref 7–17)
CALCIUM SPEC-MCNC: 9.3 MG/DL (ref 8.4–10.2)
CHLORIDE SPEC-SCNC: 100 MMOL/L (ref 98–107)
CO2 SERPL-SCNC: 29 MMOL/L (ref 22–30)
CREAT BLD-SCNC: 1.2 MG/DL (ref 0.52–1.04)
ESTIMATED GLOMERULAR FILT RATE: 43 ML/MIN (ref 60–?)
GFR (AFRICAN AMERICAN): 52 ML/MIN (ref 60–?)
GLOBULIN SER CALC-MCNC: 2.4 G/DL (ref 1.3–3.2)
GLUCOSE: 88 MG/DL (ref 74–100)
HCT VFR BLD CALC: 39.6 % (ref 37–47)
HGB BLD-MCNC: 12.9 G/DL (ref 12.2–16.2)
MAGNESIUM: 2 MG/DL (ref 1.6–2.3)
MCHC RBC-ENTMCNC: 32.5 G/DL (ref 31.8–35.4)
MCV RBC: 86.8 FL (ref 81–99)
MEAN CORPUSCULAR HEMOGLOBIN: 28.2 PG (ref 27–31.2)
PLATELET # BLD: 301 K/MM3 (ref 142–424)
POTASSIUM: 4.5 MMOL/L (ref 3.5–5.1)
PROT SERPL-MCNC: 6.7 G/DL (ref 6.3–8.2)
RBC # BLD AUTO: 4.56 M/MM3 (ref 4.2–5.4)
SODIUM SPEC-SCNC: 136 MMOL/L (ref 136–145)
WBC # BLD AUTO: 8.1 K/MM3 (ref 4.8–10.8)

## 2021-04-26 PROCEDURE — 85025 COMPLETE CBC W/AUTO DIFF WBC: CPT

## 2021-04-26 PROCEDURE — 83735 ASSAY OF MAGNESIUM: CPT

## 2021-04-26 PROCEDURE — 80053 COMPREHEN METABOLIC PANEL: CPT

## 2021-04-26 PROCEDURE — 36415 COLL VENOUS BLD VENIPUNCTURE: CPT

## 2021-06-03 ENCOUNTER — HOSPITAL ENCOUNTER (OUTPATIENT)
Age: 81
End: 2021-06-03
Payer: MEDICARE

## 2021-06-03 DIAGNOSIS — R47.01: Primary | ICD-10-CM

## 2021-06-03 PROCEDURE — 92611 MOTION FLUOROSCOPY/SWALLOW: CPT

## 2021-06-03 PROCEDURE — 70371 SPEECH EVALUATION COMPLEX: CPT

## 2021-06-22 ENCOUNTER — HOSPITAL ENCOUNTER (OUTPATIENT)
Dept: HOSPITAL 22 - ST | Age: 81
Discharge: HOME | End: 2021-06-22
Payer: MEDICARE

## 2021-06-22 DIAGNOSIS — R41.3: ICD-10-CM

## 2021-06-22 DIAGNOSIS — R05: ICD-10-CM

## 2021-06-22 DIAGNOSIS — R47.1: ICD-10-CM

## 2021-06-22 DIAGNOSIS — R47.01: Primary | ICD-10-CM

## 2021-06-22 DIAGNOSIS — R13.10: ICD-10-CM

## 2021-06-22 PROCEDURE — 92526 ORAL FUNCTION THERAPY: CPT

## 2021-06-22 PROCEDURE — 92507 TX SP LANG VOICE COMM INDIV: CPT

## 2021-06-22 PROCEDURE — 92523 SPEECH SOUND LANG COMPREHEN: CPT

## 2021-08-08 ENCOUNTER — HOSPITAL ENCOUNTER (EMERGENCY)
Age: 81
Discharge: HOME | End: 2021-08-08
Payer: MEDICARE

## 2021-08-08 VITALS
OXYGEN SATURATION: 99 % | RESPIRATION RATE: 18 BRPM | HEART RATE: 60 BPM | DIASTOLIC BLOOD PRESSURE: 49 MMHG | TEMPERATURE: 97.34 F | SYSTOLIC BLOOD PRESSURE: 100 MMHG

## 2021-08-08 VITALS
RESPIRATION RATE: 20 BRPM | DIASTOLIC BLOOD PRESSURE: 62 MMHG | TEMPERATURE: 97.4 F | OXYGEN SATURATION: 100 % | HEART RATE: 62 BPM | SYSTOLIC BLOOD PRESSURE: 133 MMHG

## 2021-08-08 VITALS — SYSTOLIC BLOOD PRESSURE: 143 MMHG | OXYGEN SATURATION: 99 % | DIASTOLIC BLOOD PRESSURE: 64 MMHG | HEART RATE: 62 BPM

## 2021-08-08 VITALS — HEART RATE: 60 BPM | OXYGEN SATURATION: 100 % | SYSTOLIC BLOOD PRESSURE: 133 MMHG | DIASTOLIC BLOOD PRESSURE: 62 MMHG

## 2021-08-08 VITALS — BODY MASS INDEX: 30.1 KG/M2

## 2021-08-08 DIAGNOSIS — G30.9: ICD-10-CM

## 2021-08-08 DIAGNOSIS — I10: ICD-10-CM

## 2021-08-08 DIAGNOSIS — F02.80: ICD-10-CM
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
## 2021-08-08 DIAGNOSIS — S42.201A: Primary | ICD-10-CM

## 2021-08-08 DIAGNOSIS — S00.81XA: ICD-10-CM

## 2021-08-08 PROCEDURE — 96372 THER/PROPH/DIAG INJ SC/IM: CPT

## 2021-08-08 PROCEDURE — 73070 X-RAY EXAM OF ELBOW: CPT

## 2021-08-08 PROCEDURE — 70450 CT HEAD/BRAIN W/O DYE: CPT

## 2021-08-08 PROCEDURE — 73030 X-RAY EXAM OF SHOULDER: CPT

## 2021-08-08 PROCEDURE — 99282 EMERGENCY DEPT VISIT SF MDM: CPT

## 2021-08-17 ENCOUNTER — HOSPITAL ENCOUNTER (OUTPATIENT)
Age: 81
End: 2021-08-17
Payer: MEDICARE

## 2021-08-17 DIAGNOSIS — S42.309A: Primary | ICD-10-CM

## 2021-08-17 PROCEDURE — 73030 X-RAY EXAM OF SHOULDER: CPT

## 2021-09-07 ENCOUNTER — HOSPITAL ENCOUNTER (OUTPATIENT)
Age: 81
End: 2021-09-07
Payer: MEDICARE

## 2021-09-07 DIAGNOSIS — S42.201D: Primary | ICD-10-CM

## 2021-09-07 PROCEDURE — 73030 X-RAY EXAM OF SHOULDER: CPT

## 2021-10-14 ENCOUNTER — HOSPITAL ENCOUNTER (OUTPATIENT)
Age: 81
End: 2021-10-14
Payer: MEDICARE

## 2021-10-14 DIAGNOSIS — Z79.899: ICD-10-CM

## 2021-10-14 DIAGNOSIS — R06.9: Primary | ICD-10-CM

## 2021-10-14 LAB
ALBUMIN LEVEL: 3.9 G/DL (ref 3.5–5)
ALBUMIN/GLOB SERPL: 1.5 {RATIO} (ref 1.1–1.8)
ALP ISO SERPL-ACNC: 156 U/L (ref 38–126)
ALT SERPLBLD-CCNC: 8 U/L (ref 12–78)
ANION GAP SERPL CALC-SCNC: 10 MEQ/L (ref 5–15)
AST SERPL QL: 17 U/L (ref 14–36)
BILIRUBIN,TOTAL: 0.5 MG/DL (ref 0.2–1.3)
BUN SERPL-MCNC: 17 MG/DL (ref 7–17)
CALCIUM SPEC-MCNC: 9 MG/DL (ref 8.4–10.2)
CHLORIDE SPEC-SCNC: 102 MMOL/L (ref 98–107)
CO2 SERPL-SCNC: 29 MMOL/L (ref 22–30)
CREAT BLD-SCNC: 1.1 MG/DL (ref 0.52–1.04)
ESTIMATED GLOMERULAR FILT RATE: 48 ML/MIN (ref 60–?)
GFR (AFRICAN AMERICAN): 58 ML/MIN (ref 60–?)
GLOBULIN SER CALC-MCNC: 2.6 G/DL (ref 1.3–3.2)
GLUCOSE: 92 MG/DL (ref 74–100)
HCT VFR BLD CALC: 35.9 % (ref 37–47)
HGB BLD-MCNC: 11.8 G/DL (ref 12.2–16.2)
MCHC RBC-ENTMCNC: 33 G/DL (ref 31.8–35.4)
MCV RBC: 88.1 FL (ref 81–99)
MEAN CORPUSCULAR HEMOGLOBIN: 29.1 PG (ref 27–31.2)
PLATELET # BLD: 277 K/MM3 (ref 142–424)
POTASSIUM: 4 MMOL/L (ref 3.5–5.1)
PROT SERPL-MCNC: 6.5 G/DL (ref 6.3–8.2)
RBC # BLD AUTO: 4.07 M/MM3 (ref 4.2–5.4)
SODIUM SPEC-SCNC: 137 MMOL/L (ref 136–145)
TSH SERPL-ACNC: 2.47 UIU/ML (ref 0.47–4.68)
WBC # BLD AUTO: 7.8 K/MM3 (ref 4.8–10.8)

## 2021-10-14 PROCEDURE — 36415 COLL VENOUS BLD VENIPUNCTURE: CPT

## 2021-10-14 PROCEDURE — 85025 COMPLETE CBC W/AUTO DIFF WBC: CPT

## 2021-10-14 PROCEDURE — 80053 COMPREHEN METABOLIC PANEL: CPT

## 2021-10-14 PROCEDURE — 84443 ASSAY THYROID STIM HORMONE: CPT

## 2021-10-19 ENCOUNTER — HOSPITAL ENCOUNTER (OUTPATIENT)
Age: 81
End: 2021-10-19
Payer: MEDICARE

## 2021-10-19 DIAGNOSIS — S42.201D: Primary | ICD-10-CM

## 2021-10-19 PROCEDURE — 73030 X-RAY EXAM OF SHOULDER: CPT
